# Patient Record
Sex: FEMALE | Race: WHITE | Employment: OTHER | URBAN - METROPOLITAN AREA
[De-identification: names, ages, dates, MRNs, and addresses within clinical notes are randomized per-mention and may not be internally consistent; named-entity substitution may affect disease eponyms.]

---

## 2021-03-16 ENCOUNTER — HOSPITAL ENCOUNTER (OUTPATIENT)
Age: 61
Discharge: HOME | End: 2021-03-16
Payer: COMMERCIAL

## 2021-03-16 LAB
ALT SERPL-CCNC: 48 U/L (ref 5–33)
ANION GAP SERPL CALCULATED.3IONS-SCNC: 7 MMOL/L (ref 9–17)
BUN BLDV-MCNC: 11 MG/DL (ref 8–23)
BUN/CREAT BLD: ABNORMAL (ref 9–20)
CALCIUM SERPL-MCNC: 9.4 MG/DL (ref 8.6–10.4)
CHLORIDE BLD-SCNC: 105 MMOL/L (ref 98–107)
CHOLESTEROL/HDL RATIO: 3.2
CHOLESTEROL: 143 MG/DL
CO2: 28 MMOL/L (ref 20–31)
CREAT SERPL-MCNC: 0.74 MG/DL (ref 0.5–0.9)
GFR AFRICAN AMERICAN: >60 ML/MIN
GFR NON-AFRICAN AMERICAN: >60 ML/MIN
GFR SERPL CREATININE-BSD FRML MDRD: ABNORMAL ML/MIN/{1.73_M2}
GFR SERPL CREATININE-BSD FRML MDRD: ABNORMAL ML/MIN/{1.73_M2}
GLUCOSE BLD-MCNC: 101 MG/DL (ref 70–99)
HDLC SERPL-MCNC: 45 MG/DL
LDL CHOLESTEROL: 65 MG/DL (ref 0–130)
POTASSIUM SERPL-SCNC: 4.4 MMOL/L (ref 3.7–5.3)
SODIUM BLD-SCNC: 140 MMOL/L (ref 135–144)
TRIGL SERPL-MCNC: 166 MG/DL
VLDLC SERPL CALC-MCNC: ABNORMAL MG/DL (ref 1–30)

## 2021-03-16 PROCEDURE — 36415 COLL VENOUS BLD VENIPUNCTURE: CPT

## 2021-03-16 PROCEDURE — 80048 BASIC METABOLIC PNL TOTAL CA: CPT

## 2021-03-16 PROCEDURE — 80061 LIPID PANEL: CPT

## 2021-03-16 PROCEDURE — 84460 ALANINE AMINO (ALT) (SGPT): CPT

## 2021-04-01 ENCOUNTER — HOSPITAL ENCOUNTER (OUTPATIENT)
Age: 61
Discharge: HOME | End: 2021-04-01
Payer: COMMERCIAL

## 2021-04-01 ENCOUNTER — HOSPITAL ENCOUNTER (OUTPATIENT)
Dept: ULTRASOUND IMAGING | Age: 61
Discharge: HOME | End: 2021-04-03
Payer: COMMERCIAL

## 2021-04-01 DIAGNOSIS — K75.9 HEPATITIS: ICD-10-CM

## 2021-04-01 LAB
FERRITIN: 256 UG/L (ref 13–150)
HBV SURFACE AB TITR SER: <3.5 MIU/ML
HEPATITIS B CORE TOTAL ANTIBODY: NONREACTIVE
HEPATITIS C ANTIBODY: NONREACTIVE

## 2021-04-01 PROCEDURE — 86645 CMV ANTIBODY IGM: CPT

## 2021-04-01 PROCEDURE — 86317 IMMUNOASSAY INFECTIOUS AGENT: CPT

## 2021-04-01 PROCEDURE — 86644 CMV ANTIBODY: CPT

## 2021-04-01 PROCEDURE — 36415 COLL VENOUS BLD VENIPUNCTURE: CPT

## 2021-04-01 PROCEDURE — 86704 HEP B CORE ANTIBODY TOTAL: CPT

## 2021-04-01 PROCEDURE — 86803 HEPATITIS C AB TEST: CPT

## 2021-04-01 PROCEDURE — 82728 ASSAY OF FERRITIN: CPT

## 2021-04-01 PROCEDURE — 76705 ECHO EXAM OF ABDOMEN: CPT

## 2021-04-02 LAB
CMV IGM: 0.1
CYTOMEGALOVIRUS IGG ANTIBODY: 34.1

## 2021-04-15 ENCOUNTER — TELEPHONE (OUTPATIENT)
Dept: GASTROENTEROLOGY | Age: 61
End: 2021-04-15

## 2021-04-15 ENCOUNTER — HOSPITAL ENCOUNTER (OUTPATIENT)
Age: 61
Discharge: HOME | End: 2021-04-15
Payer: COMMERCIAL

## 2021-04-15 PROCEDURE — 36415 COLL VENOUS BLD VENIPUNCTURE: CPT

## 2021-04-15 PROCEDURE — 81256 HFE GENE: CPT

## 2021-04-15 NOTE — TELEPHONE ENCOUNTER
Ahmet left voicemail message requesting call back to discuss scheduling screening colonoscopy from referral

## 2021-04-16 NOTE — TELEPHONE ENCOUNTER
Talked to [facility], located within [facility] regarding scheduling. After review of np questionnaire she is taking Plavix and will need a consultation with the physician prior to colonoscopy.   She states she doesn't want a colonoscopy and is going to go back to her PCP to request a Cologuard test.

## 2021-04-20 LAB
C282Y HEMOCHROMATOSIS MUT: NORMAL
H63D HEMOCHROMATOSIS MUT: NORMAL
HEMOCHROMATOSIS MUTATION INT: NORMAL
HEMOCHROMATOSIS SPECIMEN: NORMAL
S65C HEMOCHROMATOSIS MUT: NEGATIVE

## 2021-06-12 ENCOUNTER — HOSPITAL ENCOUNTER (OUTPATIENT)
Dept: WOMENS IMAGING | Age: 61
Discharge: HOME | End: 2021-06-14
Payer: COMMERCIAL

## 2021-06-12 DIAGNOSIS — Z12.39 SCREENING BREAST EXAMINATION: ICD-10-CM

## 2021-06-12 PROCEDURE — 77063 BREAST TOMOSYNTHESIS BI: CPT

## 2022-04-26 ENCOUNTER — HOSPITAL ENCOUNTER (OUTPATIENT)
Age: 62
Discharge: HOME | End: 2022-04-26
Payer: COMMERCIAL

## 2022-04-26 ENCOUNTER — TELEPHONE (OUTPATIENT)
Dept: ONCOLOGY | Age: 62
End: 2022-04-26

## 2022-04-26 ENCOUNTER — INITIAL CONSULT (OUTPATIENT)
Dept: ONCOLOGY | Age: 62
End: 2022-04-26
Payer: COMMERCIAL

## 2022-04-26 VITALS
BODY MASS INDEX: 35.93 KG/M2 | RESPIRATION RATE: 18 BRPM | TEMPERATURE: 97.4 F | DIASTOLIC BLOOD PRESSURE: 86 MMHG | SYSTOLIC BLOOD PRESSURE: 150 MMHG | HEART RATE: 102 BPM | WEIGHT: 223.6 LBS | HEIGHT: 66 IN

## 2022-04-26 DIAGNOSIS — F17.200 SMOKING ADDICTION: ICD-10-CM

## 2022-04-26 DIAGNOSIS — R79.89 ELEVATED FERRITIN: ICD-10-CM

## 2022-04-26 DIAGNOSIS — R79.89 ELEVATED FERRITIN: Primary | ICD-10-CM

## 2022-04-26 LAB
ABSOLUTE EOS #: 0.2 K/UL (ref 0–0.4)
ABSOLUTE LYMPH #: 2.2 K/UL (ref 1–4.8)
ABSOLUTE MONO #: 0.8 K/UL (ref 0.1–1.2)
BASOPHILS # BLD: 1 % (ref 0–2)
BASOPHILS ABSOLUTE: 0.1 K/UL (ref 0–0.2)
EOSINOPHILS RELATIVE PERCENT: 3 % (ref 1–4)
FERRITIN: 218 NG/ML (ref 13–150)
HCT VFR BLD CALC: 45.2 % (ref 36–46)
HEMOGLOBIN: 15.3 G/DL (ref 12–16)
IRON SATURATION: 27 % (ref 20–55)
IRON: 59 UG/DL (ref 37–145)
LYMPHOCYTES # BLD: 36 % (ref 24–44)
MCH RBC QN AUTO: 31.5 PG (ref 26–34)
MCHC RBC AUTO-ENTMCNC: 33.8 G/DL (ref 31–37)
MCV RBC AUTO: 93.1 FL (ref 80–100)
MONOCYTES # BLD: 12 % (ref 2–11)
PDW BLD-RTO: 13.4 % (ref 12.5–15.4)
PLATELET # BLD: 253 K/UL (ref 140–450)
PMV BLD AUTO: 7.6 FL (ref 6–12)
RBC # BLD: 4.85 M/UL (ref 4–5.2)
SEG NEUTROPHILS: 48 % (ref 36–66)
SEGMENTED NEUTROPHILS ABSOLUTE COUNT: 2.9 K/UL (ref 1.8–7.7)
TOTAL IRON BINDING CAPACITY: 218 UG/DL (ref 250–450)
UNSATURATED IRON BINDING CAPACITY: 159 UG/DL (ref 112–347)
WBC # BLD: 6.1 K/UL (ref 3.5–11)

## 2022-04-26 PROCEDURE — 83550 IRON BINDING TEST: CPT

## 2022-04-26 PROCEDURE — 99244 OFF/OP CNSLTJ NEW/EST MOD 40: CPT | Performed by: INTERNAL MEDICINE

## 2022-04-26 PROCEDURE — 82728 ASSAY OF FERRITIN: CPT

## 2022-04-26 PROCEDURE — 81256 HFE GENE: CPT

## 2022-04-26 PROCEDURE — 85025 COMPLETE CBC W/AUTO DIFF WBC: CPT

## 2022-04-26 PROCEDURE — 36415 COLL VENOUS BLD VENIPUNCTURE: CPT

## 2022-04-26 PROCEDURE — 83540 ASSAY OF IRON: CPT

## 2022-04-26 RX ORDER — CLOPIDOGREL BISULFATE 75 MG/1
TABLET ORAL
COMMUNITY
Start: 2022-04-21

## 2022-04-26 RX ORDER — CARVEDILOL 12.5 MG/1
TABLET ORAL
COMMUNITY
Start: 2022-04-09

## 2022-04-26 RX ORDER — MECLIZINE HYDROCHLORIDE CHEWABLE TABLETS 25 MG/1
25 TABLET, CHEWABLE ORAL AS NEEDED
COMMUNITY

## 2022-04-26 RX ORDER — ASPIRIN 325 MG
325 TABLET ORAL DAILY
COMMUNITY
End: 2022-06-02 | Stop reason: ALTCHOICE

## 2022-04-26 RX ORDER — ATORVASTATIN CALCIUM 40 MG/1
TABLET, FILM COATED ORAL
COMMUNITY
Start: 2022-04-21

## 2022-04-26 NOTE — PROGRESS NOTES
DIAGNOSIS:   1.  elevated ferritin    CURRENT THERAPY:  Plan for work up to exclude hereditary hemochromatosis    BRIEF CASE HISTORY:   Pura Cuellar is a very pleasant 60 y.o. female who is referred to us for hemachromatosis. She had lab work done in  which showed elevated ferritin and she is now following up. She reports she took oral iron for several years when she was menstruating. She takes Synthroid and cholesterol medication. She denies history of diabetes. She has history of heart attack and is taking Plavix and aspirin. But I found out she takes 2 tablets of the 325 mg strength every night. . She has smoking addiction. She reports her father  of unknown blood disease. PAST MEDICAL HISTORY: has a past medical history of Hypertension. Elevated cholesterol    PAST SURGICAL HISTORY: has no past surgical history on file. CURRENT MEDICATIONS:  has a current medication list which includes the following prescription(s): meclizine, aspirin, carvedilol, atorvastatin, and clopidogrel. ALLERGIES:  has No Known Allergies. FAMILY HISTORY: Negative for any hematological or oncological conditions. Except for what was described above about her father dying from an unknown blood disease    SOCIAL HISTORY:  reports that she has been smoking cigarettes. She has a 10.00 pack-year smoking history. She has never used smokeless tobacco. She reports previous alcohol use. She reports that she does not use drugs. REVIEW OF SYSTEMS:   General: No fever or night sweats. Weight is stable. ENT: No double or blurred vision, no tinnitus or hearing problem, no dysphagia or sore throat   Respiratory: No chest pain, no shortness of breath, no cough or hemoptysis. Cardiovascular: Denies chest pain, PND or orthopnea. No L E swelling or palpitations. Gastrointestinal: No nausea or vomiting, abdominal pain, diarrhea or constipation.    Genitourinary: Denies dysuria, hematuria, frequency, urgency or incontinence. Neurological: Denies headaches, decreased LOC, no sensory or motor focal deficits. Musculoskeletal: No arthralgia no back pain or joint swelling. Skin: There are no rashes or bleeding. Psychiatric: No anxiety, no depression. Endocrine: No diabetes or thyroid disease. Hematologic: No bleeding, no adenopathy. PHYSICAL EXAM: Shows a well appearing 60y.o.-year-old female who is not in pain or distress. Vital Signs: Blood pressure (!) 150/86, pulse 102, temperature 97.4 °F (36.3 °C), temperature source Oral, resp. rate 18, height 5' 6\" (1.676 m), weight 223 lb 9.6 oz (101.4 kg), not currently breastfeeding. HEENT: Normocephalic and atraumatic. Pupils are equal, round, reactive to light and accommodation. Extraocular muscles are intact. Neck: Showed no JVD, no carotid bruit. Lungs: Clear to auscultation bilaterally. Heart: Regular without any murmur. Abdomen: Soft, nontender. No hepatosplenomegaly. Extremities: Lower extremities show no edema, clubbing, or cyanosis. Breasts: Examination not done today. Neuro exam: intact cranial nerves bilaterally no motor or sensory deficit, gait is normal. Lymphatic: no adenopathy appreciated in the supraclavicular, axillary, cervical or inguinal area  Minimal bruising     REVIEW OF LABORATORY DATA:   No results found for: WBC, HGB, HCT, MCV, PLT     Chemistry        Component Value Date/Time     03/16/2021 0840    K 4.4 03/16/2021 0840     03/16/2021 0840    CO2 28 03/16/2021 0840    BUN 11 03/16/2021 0840    CREATININE 0.74 03/16/2021 0840        Component Value Date/Time    CALCIUM 9.4 03/16/2021 0840    ALT 48 (H) 03/16/2021 0840          Lab Results   Component Value Date    FERRITIN 256 (H) 04/01/2021         REVIEW OF RADIOLOGICAL RESULTS:     IMPRESSION:   Elevated ferritin  Smoking addiction   Plan for work up    PLAN:   1. We discussed her elevated ferritin.    2. I explained possible causes including inflammatory condition, liver distribution, excessive supplementation, or increased absorption. 3. I discussed plan for updated lab work including work up to include JAK2 mutation. 4. We had discussion regarding treatment with persistent hemachromatosis. 5. We reviewed her medical history and current medications, I asked her to discuss taking 2 aspirin nightly with her PCP, I recommend single dose and to use Tylenol for pain. 6. I counseled her on smoking cessation and advised her to quit smoking. We offered the patches but she is not interested  7. Return to review results. Scribe Attestation   This note was created by Mari Cabrlaes acting as scribe for the physician signing this note  Electronically Signed  Heather Rasmussen, 4/26/2022  Scribe, [employer]. Attending Attestation   Note was reviewed and edited.   I am in agreement with the note as entered    Shai Faustin MD  Hematologist/Medical [id number] [facility] hematology oncology physicians

## 2022-04-28 ENCOUNTER — HOSPITAL ENCOUNTER (OUTPATIENT)
Age: 62
Discharge: HOME | End: 2022-04-28
Payer: COMMERCIAL

## 2022-04-28 LAB
ABSOLUTE EOS #: 0.1 K/UL (ref 0–0.4)
ABSOLUTE LYMPH #: 1.6 K/UL (ref 1–4.8)
ABSOLUTE MONO #: 0.6 K/UL (ref 0.1–1.3)
ALT SERPL-CCNC: 54 U/L (ref 5–33)
ANION GAP SERPL CALCULATED.3IONS-SCNC: 11 MMOL/L (ref 9–17)
BASOPHILS # BLD: 1 % (ref 0–2)
BASOPHILS ABSOLUTE: 0 K/UL (ref 0–0.2)
BUN BLDV-MCNC: 8 MG/DL (ref 8–23)
CALCIUM SERPL-MCNC: 9.5 MG/DL (ref 8.6–10.4)
CHLORIDE BLD-SCNC: 106 MMOL/L (ref 98–107)
CHOLESTEROL/HDL RATIO: 3.1
CHOLESTEROL: 137 MG/DL
CO2: 22 MMOL/L (ref 20–31)
CREAT SERPL-MCNC: 0.63 MG/DL (ref 0.5–0.9)
EOSINOPHILS RELATIVE PERCENT: 2 % (ref 0–4)
GFR AFRICAN AMERICAN: >60 ML/MIN
GFR NON-AFRICAN AMERICAN: >60 ML/MIN
GFR SERPL CREATININE-BSD FRML MDRD: ABNORMAL ML/MIN/{1.73_M2}
GLUCOSE BLD-MCNC: 103 MG/DL (ref 70–99)
HCT VFR BLD CALC: 44.1 % (ref 36–46)
HDLC SERPL-MCNC: 44 MG/DL
HEMOGLOBIN: 15.2 G/DL (ref 12–16)
LDL CHOLESTEROL: 66 MG/DL (ref 0–130)
LYMPHOCYTES # BLD: 28 % (ref 24–44)
MCH RBC QN AUTO: 31.5 PG (ref 26–34)
MCHC RBC AUTO-ENTMCNC: 34.5 G/DL (ref 31–37)
MCV RBC AUTO: 91.4 FL (ref 80–100)
MONOCYTES # BLD: 10 % (ref 1–7)
PDW BLD-RTO: 13.3 % (ref 11.5–14.9)
PLATELET # BLD: 287 K/UL (ref 150–450)
PMV BLD AUTO: 7.7 FL (ref 6–12)
POTASSIUM SERPL-SCNC: 4.1 MMOL/L (ref 3.7–5.3)
RBC # BLD: 4.83 M/UL (ref 4–5.2)
SEG NEUTROPHILS: 59 % (ref 36–66)
SEGMENTED NEUTROPHILS ABSOLUTE COUNT: 3.5 K/UL (ref 1.3–9.1)
SODIUM BLD-SCNC: 139 MMOL/L (ref 135–144)
T3 FREE: 3.78 PG/ML (ref 2.02–4.43)
THYROXINE, FREE: 0.89 NG/DL (ref 0.93–1.7)
TRIGL SERPL-MCNC: 136 MG/DL
TSH SERPL DL<=0.05 MIU/L-ACNC: 4.02 UIU/ML (ref 0.3–5)
WBC # BLD: 5.8 K/UL (ref 3.5–11)

## 2022-04-28 PROCEDURE — 80048 BASIC METABOLIC PNL TOTAL CA: CPT

## 2022-04-28 PROCEDURE — 84443 ASSAY THYROID STIM HORMONE: CPT

## 2022-04-28 PROCEDURE — 80061 LIPID PANEL: CPT

## 2022-04-28 PROCEDURE — 84439 ASSAY OF FREE THYROXINE: CPT

## 2022-04-28 PROCEDURE — 85025 COMPLETE CBC W/AUTO DIFF WBC: CPT

## 2022-04-28 PROCEDURE — 36415 COLL VENOUS BLD VENIPUNCTURE: CPT

## 2022-04-28 PROCEDURE — 84460 ALANINE AMINO (ALT) (SGPT): CPT

## 2022-04-28 PROCEDURE — 84481 FREE ASSAY (FT-3): CPT

## 2022-06-02 ENCOUNTER — TELEMEDICINE (OUTPATIENT)
Dept: ONCOLOGY | Age: 62
End: 2022-06-02
Payer: COMMERCIAL

## 2022-06-02 ENCOUNTER — TELEPHONE (OUTPATIENT)
Dept: ONCOLOGY | Age: 62
End: 2022-06-02

## 2022-06-02 DIAGNOSIS — E83.110 HEREDITARY HEMOCHROMATOSIS (HCC): ICD-10-CM

## 2022-06-02 DIAGNOSIS — R79.89 ELEVATED FERRITIN: Primary | ICD-10-CM

## 2022-06-02 PROCEDURE — 99214 OFFICE O/P EST MOD 30 MIN: CPT | Performed by: INTERNAL MEDICINE

## 2022-06-02 NOTE — PROGRESS NOTES
DIAGNOSIS:   1.  elevated ferritin  2. 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 female who is referred to us for hemachromatosis. She had lab work done in  which showed elevated ferritin and she is now following up. She reports she took oral iron for several years when she was menstruating. She takes Synthroid and cholesterol medication. She denies history of diabetes. She has history of heart attack and is taking Plavix and aspirin. But I found out she takes 2 tablets of the 325 mg strength every night. . She has smoking addiction. She reports her father  of unknown blood disease. We saw the patient and genetic testing was done. 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 Her genetic testing correlates with her mildly elevated ferritin of 260. INTERIM HISTORY  This is a virtual visit. I am updating the patient about her genetic testing. She overall feels well. She is avoiding Tylenol and alcohol. She is fairly active without any limitation. She denies any bleeding. PAST MEDICAL HISTORY: has a past medical history of Hypertension. Elevated cholesterol    PAST SURGICAL HISTORY: has no past surgical history on file. CURRENT MEDICATIONS:  has a current medication list which includes the following prescription(s): carvedilol, atorvastatin, clopidogrel, and meclizine. ALLERGIES:  has No Known Allergies. FAMILY HISTORY: Negative for any hematological or oncological conditions. Except for what was described above about her father dying from an unknown blood disease    SOCIAL HISTORY:  reports that she has been smoking cigarettes. She has a 10.00 pack-year smoking history. She has never used smokeless tobacco. She reports previous alcohol use. She reports that she does not use drugs.     REVIEW OF SYSTEMS:   General: No fever or night sweats. Weight is stable. ENT: No double or blurred vision, no tinnitus or hearing problem, no dysphagia or sore throat   Respiratory: No chest pain, no shortness of breath, no cough or hemoptysis. Cardiovascular: Denies chest pain, PND or orthopnea. No L E swelling or palpitations. Gastrointestinal: No nausea or vomiting, abdominal pain, diarrhea or constipation. Genitourinary: Denies dysuria, hematuria, frequency, urgency or incontinence. Neurological: Denies headaches, decreased LOC, no sensory or motor focal deficits. Musculoskeletal: No arthralgia no back pain or joint swelling. Skin: There are no rashes or bleeding. Psychiatric: No anxiety, no depression. Endocrine: No diabetes or thyroid disease. Hematologic: No bleeding, no adenopathy. PHYSICAL EXAM:     PHYSICAL EXAMINATION:    Vital Signs: (As obtained by patient/caregiver or practitioner observation)    Blood pressure-  Heart rate-    Respiratory rate-    Temperature-  Pulse oximetry-     Constitutional: [x] Appears well-developed and well-nourished [x] No apparent distress      [] Abnormal-   Mental status  [x] Alert and awake  [x] Oriented to person/place/time [x]Able to follow commands      Eyes:  EOM    [x]  Normal  [] Abnormal-  Sclera  [x]  Normal  [] Abnormal -         Discharge [x]  None visible  [] Abnormal -    HENT:   [x] Normocephalic, atraumatic.   [] Abnormal   [x] Mouth/Throat: Mucous membranes are moist.     External Ears [x] Normal  [] Abnormal-     Neck: [x] No visualized mass     Pulmonary/Chest: [x] Respiratory effort normal.  [x] No visualized signs of difficulty breathing or respiratory distress        [] Abnormal-      Musculoskeletal:   [x] Normal gait with no signs of ataxia         [x] Normal range of motion of neck        [] Abnormal-       Neurological:        [x] No Facial Asymmetry (Cranial nerve 7 motor function) (limited exam to video visit)          [x] No gaze palsy [] Abnormal-         Skin:        [x] No significant exanthematous lesions or discoloration noted on facial skin         [] Abnormal-            Psychiatric:       [x] Normal Affect [x] No Hallucinations        [] Abnormal-     Other pertinent observable physical exam findings-                        REVIEW OF LABORATORY DATA:   Lab Results   Component Value Date    WBC 5.8 04/28/2022    HGB 15.2 04/28/2022    HCT 44.1 04/28/2022    MCV 91.4 04/28/2022     04/28/2022        Chemistry        Component Value Date/Time     04/28/2022 0828    K 4.1 04/28/2022 0828     04/28/2022 0828    CO2 22 04/28/2022 0828    BUN 8 04/28/2022 0828    CREATININE 0.63 04/28/2022 0828        Component Value Date/Time    CALCIUM 9.5 04/28/2022 0828    ALT 54 (H) 04/28/2022 0828          Lab Results   Component Value Date    IRON 59 04/26/2022    TIBC 218 (L) 04/26/2022    FERRITIN 218 (H) 04/26/2022         REVIEW OF RADIOLOGICAL RESULTS:     IMPRESSION:   Elevated ferritin  Smoking addiction   Genetic testing showed compound heterozygous status explaining her mildly elevated ferritin    PLAN:   1. I did very long discussion with the patient. Genetic testing was explained to the patient in detail. She had many questions that were answered. 2. She has mildly elevated ferritin which correlates with her compound heterozygous state. 3. Her ferritin is not elevated enough for any active management. We talked about dietary restriction. I asked her to avoid oral iron supplements and symptoms iron. We also talked about iron rich diet such as liver, red meat, cereal, lentils. I did not ask her to particularly avoid those diet but try to eat them in small amount and we will watch her ferritin over the next 6 months. 4. The patient is agreeable with the plan. We will see her back in 6 months for follow-up. She prefers virtual visits.     Tracy Berry Kitchen MD Ramin  Hematologist/Medical Oncologist  [facility] hematology oncology physicians    Attestation   The patient  being evaluated by a Virtual Visit (video visit) encounter to address concerns as mentioned above. A caregiver was present when appropriate. Due to this being a TeleHealth encounter (During TIAXW-80 public health emergency), evaluation of the following organ systems was limited: Vitals/Constitutional/EENT/Resp/CV/GI//MS/Neuro/Skin/Heme-Lymph-Imm. Pursuant to the emergency declaration under the [address] [place] [address] authority and the [employer] Act, this Virtual Visit was conducted with patient's (and/or legal guardian's) consent, to reduce the patient's risk of exposure to COVID-19 and provide necessary medical care. The patient (and/or legal guardian) has also been advised to contact this office for worsening conditions or problems, and seek emergency medical treatment and/or call 911 if deemed necessary. Services were provided through a video synchronous discussion virtually to substitute for in-person clinic visit. Patient and provider were located at their individual homes. --Phylicia Schmidt MD on 4/6/2020 at 3:50 PM    An electronic signature was used to authenticate this note. ;

## 2022-06-02 NOTE — TELEPHONE ENCOUNTER
AVS from 6/2/22    Please schedule vv in 6 months, needs cbc and ferritin before next visit.     rvv scheduled for 12/1/22 @ 4     Pt will have labs drawn 1 week prior to next visit     Will send pt Egodeushart message    Electronically signed by Mary Joyce on 6/2/2022 at 8:52 AM

## 2022-11-28 DIAGNOSIS — R79.89 ELEVATED FERRITIN: ICD-10-CM

## 2022-11-28 DIAGNOSIS — E83.110 HEREDITARY HEMOCHROMATOSIS (HCC): Primary | ICD-10-CM

## 2022-11-28 DIAGNOSIS — R79.89 ELEVATED FERRITIN: Primary | ICD-10-CM
